# Patient Record
Sex: MALE | Race: WHITE | NOT HISPANIC OR LATINO
[De-identification: names, ages, dates, MRNs, and addresses within clinical notes are randomized per-mention and may not be internally consistent; named-entity substitution may affect disease eponyms.]

---

## 2020-08-20 ENCOUNTER — APPOINTMENT (OUTPATIENT)
Dept: NEUROSURGERY | Facility: CLINIC | Age: 45
End: 2020-08-20
Payer: COMMERCIAL

## 2020-08-20 VITALS
HEART RATE: 87 BPM | RESPIRATION RATE: 16 BRPM | WEIGHT: 205 LBS | BODY MASS INDEX: 27.17 KG/M2 | OXYGEN SATURATION: 97 % | SYSTOLIC BLOOD PRESSURE: 113 MMHG | DIASTOLIC BLOOD PRESSURE: 77 MMHG | HEIGHT: 73 IN | TEMPERATURE: 97.1 F

## 2020-08-20 DIAGNOSIS — I63.9 CEREBRAL INFARCTION, UNSPECIFIED: ICD-10-CM

## 2020-08-20 DIAGNOSIS — Z86.79 PERSONAL HISTORY OF OTHER DISEASES OF THE CIRCULATORY SYSTEM: ICD-10-CM

## 2020-08-20 DIAGNOSIS — E78.5 HYPERLIPIDEMIA, UNSPECIFIED: ICD-10-CM

## 2020-08-20 DIAGNOSIS — I48.92 UNSPECIFIED ATRIAL FLUTTER: ICD-10-CM

## 2020-08-20 PROCEDURE — 99214 OFFICE O/P EST MOD 30 MIN: CPT

## 2020-08-26 PROBLEM — I48.92 ATRIAL FLUTTER: Status: ACTIVE | Noted: 2020-08-26

## 2020-08-26 PROBLEM — Z86.79 HISTORY OF ATRIAL FLUTTER: Status: RESOLVED | Noted: 2020-08-26 | Resolved: 2020-08-26

## 2020-08-26 NOTE — HISTORY OF PRESENT ILLNESS
[FreeTextEntry1] : Intracranial Aneurysm [de-identified] : 44 year old male with recent diagnosis of large petrous carotid artery aneurysm found during work-up of an episode of expressive aphasia.\par \par In May 2020 he developed an episode of expressive aphasia. HE couldn't talk but he could text. This occurred after working with few hours of sleep. It resolved within 40 min and did not recur. HAd MRI without stroke or hemorrhage. Cardiac work-up was negative including Holter monitor.\par \par He had an episode of atrial flutter in 2011. After the TIA he was prescribed Eliquis and is followed by Dr. Bishop (Neurologist at [place]).\par \par He denies facial or extremity weakness, language impairment, vision loss, double vision. 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

## 2020-08-26 NOTE — ASSESSMENT
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 \par \par I recommended endovascular treatment with stent-assisted embolization.\par \par The risks, benefits and alternatives of endovascular treatment were discussed with the patient in detail. In my personal experience, the risks are very rare, but the possibility is not zero. Risks include stroke, brain hemorrhage, any type of disability (facial or extremity weakness, facial or extremity numbness, speech difficulties, blindness) and death. There are also possible complications related to the incisions such as infection, pain, swelling and bleeding.\par \par Treatment will require stent-assisted embolization and therefore the patient needs premedication with dual antiplatelet therapy and continuing dual-antiplatelet therapy for 3-6 months after treatment. Will need antiplatelet monotherapy for life.\par \par I discussed the antiplatelet therapy plan with his Neurologist Dr. Bishop and we agreed to discontinue Eliquis and start Aspirin 81 daily and Ticagrelor 90 mg twice daily. \par \par PLAN\par Embolization of RIGHT carotid artery aneurysm\par Premedication with Aspirin 81mg daily and Ticagrelor 81 mg twice daily, starting 5 days prior to the procedure.\par Will Stop Eliquis prior to starting aspirin and Ticagrelor\par \par PRE-SURGICAL WORK-UP:\par Comprehensive Metabolic Panel\par CBC with Platelets and Differential\par PT, PTT, INR\par Covid-19 Testing \par Medical Clearance\par Cardiac Clearance\par Pre-op Imaging: None\par Anesthesia: GETA\par

## 2020-08-28 RX ORDER — TICAGRELOR 90 MG/1
90 TABLET ORAL
Qty: 5 | Refills: 0 | Status: DISCONTINUED | COMMUNITY
Start: 2020-08-28 | End: 2020-08-28

## 2020-09-04 ENCOUNTER — OUTPATIENT (OUTPATIENT)
Dept: OUTPATIENT SERVICES | Facility: HOSPITAL | Age: 45
LOS: 1 days | End: 2020-09-04
Payer: COMMERCIAL

## 2020-09-04 VITALS
OXYGEN SATURATION: 100 % | HEART RATE: 74 BPM | TEMPERATURE: 99 F | WEIGHT: 203.93 LBS | HEIGHT: 73 IN | SYSTOLIC BLOOD PRESSURE: 130 MMHG | RESPIRATION RATE: 18 BRPM | DIASTOLIC BLOOD PRESSURE: 87 MMHG

## 2020-09-04 DIAGNOSIS — G45.9 TRANSIENT CEREBRAL ISCHEMIC ATTACK, UNSPECIFIED: ICD-10-CM

## 2020-09-04 DIAGNOSIS — M71.21 SYNOVIAL CYST OF POPLITEAL SPACE [BAKER], RIGHT KNEE: Chronic | ICD-10-CM

## 2020-09-04 DIAGNOSIS — Z15.89 GENETIC SUSCEPTIBILITY TO OTHER DISEASE: ICD-10-CM

## 2020-09-04 DIAGNOSIS — I72.0 ANEURYSM OF CAROTID ARTERY: ICD-10-CM

## 2020-09-04 DIAGNOSIS — Z01.818 ENCOUNTER FOR OTHER PREPROCEDURAL EXAMINATION: ICD-10-CM

## 2020-09-04 PROCEDURE — 86850 RBC ANTIBODY SCREEN: CPT

## 2020-09-04 PROCEDURE — 86901 BLOOD TYPING SEROLOGIC RH(D): CPT

## 2020-09-04 PROCEDURE — 85027 COMPLETE CBC AUTOMATED: CPT

## 2020-09-04 PROCEDURE — 80048 BASIC METABOLIC PNL TOTAL CA: CPT

## 2020-09-04 PROCEDURE — 86900 BLOOD TYPING SEROLOGIC ABO: CPT

## 2020-09-04 PROCEDURE — G0463: CPT

## 2020-09-04 NOTE — H&P PST ADULT - NSICDXPROBLEM_GEN_ALL_CORE_FT
PROBLEM DIAGNOSES  Problem: Aneurysm of carotid artery  Assessment and Plan: coming in for cerebral aneurysm     Problem: Heterozygous for SERPINE1 4G allele  Assessment and Plan: No further workup needed. Heme note sent to Dr. Campbell    Problem: TIA on medication  Assessment and Plan: Pt will switch from Elijajaus to Brilinta for surgery as per cardio  Cardio note sent to Dr. Campbell

## 2020-09-04 NOTE — H&P PST ADULT - NSICDXPASTMEDICALHX_GEN_ALL_CORE_FT
PAST MEDICAL HISTORY:  Atrial flutter self limited 1/2001    Glenoid labrum tear Left 9/2014    Head concussion 12/1998    History of rhabdomyolysis 2/2011    Lyme disease 5/2015 tx    PFO (patent foramen ovale) no treatment    Sleep apnea c-pap    TIA (transient ischemic attack) 5/2020 40 min of expressive aphasia

## 2020-09-04 NOTE — H&P PST ADULT - HISTORY OF PRESENT ILLNESS
44yr old male with hx of TIA 5/2020 of expressive aphasia for 40min.  work-up for TIA on CT scan showed Right carotid aneurysm. Pt now coming   in for cerebral angiogram. Pt hx sig for Large PFO MARGAUX c-pap. No BLE thrombus  Dr's. progress notes were faxed to Dr. Campbell    Note; covid test Shanti result faxed to [facility]

## 2020-09-06 PROBLEM — G45.9 TRANSIENT CEREBRAL ISCHEMIC ATTACK, UNSPECIFIED: Chronic | Status: ACTIVE | Noted: 2020-09-04

## 2020-09-06 PROBLEM — Q21.1 ATRIAL SEPTAL DEFECT: Chronic | Status: ACTIVE | Noted: 2020-09-04

## 2020-09-06 PROBLEM — Z87.39 PERSONAL HISTORY OF OTHER DISEASES OF THE MUSCULOSKELETAL SYSTEM AND CONNECTIVE TISSUE: Chronic | Status: ACTIVE | Noted: 2020-09-04

## 2020-09-06 PROBLEM — G47.30 SLEEP APNEA, UNSPECIFIED: Chronic | Status: ACTIVE | Noted: 2020-09-04

## 2020-09-06 PROBLEM — S06.0X9A CONCUSSION WITH LOSS OF CONSCIOUSNESS OF UNSPECIFIED DURATION, INITIAL ENCOUNTER: Chronic | Status: ACTIVE | Noted: 2020-09-04

## 2020-09-06 PROBLEM — A69.20 LYME DISEASE, UNSPECIFIED: Chronic | Status: ACTIVE | Noted: 2020-09-04

## 2020-09-06 PROBLEM — S43.439A SUPERIOR GLENOID LABRUM LESION OF UNSPECIFIED SHOULDER, INITIAL ENCOUNTER: Chronic | Status: ACTIVE | Noted: 2020-09-04

## 2020-09-09 ENCOUNTER — APPOINTMENT (OUTPATIENT)
Dept: NEUROSURGERY | Facility: CLINIC | Age: 45
End: 2020-09-09
Payer: COMMERCIAL

## 2020-09-09 ENCOUNTER — TRANSCRIPTION ENCOUNTER (OUTPATIENT)
Age: 45
End: 2020-09-09

## 2020-09-09 ENCOUNTER — APPOINTMENT (OUTPATIENT)
Dept: NEUROSURGERY | Facility: CLINIC | Age: 45
End: 2020-09-09

## 2020-09-09 ENCOUNTER — INPATIENT (INPATIENT)
Facility: HOSPITAL | Age: 45
LOS: 0 days | Discharge: HOME | DRG: 26 | End: 2020-09-10
Attending: RADIOLOGY | Admitting: RADIOLOGY
Payer: COMMERCIAL

## 2020-09-09 VITALS
HEART RATE: 80 BPM | RESPIRATION RATE: 16 BRPM | OXYGEN SATURATION: 100 % | DIASTOLIC BLOOD PRESSURE: 94 MMHG | TEMPERATURE: 98 F | HEIGHT: 73 IN | SYSTOLIC BLOOD PRESSURE: 150 MMHG | WEIGHT: 205.03 LBS

## 2020-09-09 DIAGNOSIS — M71.21 SYNOVIAL CYST OF POPLITEAL SPACE [BAKER], RIGHT KNEE: Chronic | ICD-10-CM

## 2020-09-09 DIAGNOSIS — I72.0 ANEURYSM OF CAROTID ARTERY: ICD-10-CM

## 2020-09-09 LAB
ANION GAP SERPL CALC-SCNC: 11 MMOL/L — SIGNIFICANT CHANGE UP (ref 5–17)
BUN SERPL-MCNC: 19 MG/DL — SIGNIFICANT CHANGE UP (ref 7–23)
CALCIUM SERPL-MCNC: 8.7 MG/DL — SIGNIFICANT CHANGE UP (ref 8.4–10.5)
CHLORIDE SERPL-SCNC: 108 MMOL/L — SIGNIFICANT CHANGE UP (ref 96–108)
CO2 SERPL-SCNC: 21 MMOL/L — LOW (ref 22–31)
CREAT SERPL-MCNC: 0.91 MG/DL — SIGNIFICANT CHANGE UP (ref 0.5–1.3)
GLUCOSE SERPL-MCNC: 160 MG/DL — HIGH (ref 70–99)
HCT VFR BLD CALC: 38.1 % — LOW (ref 39–50)
HGB BLD-MCNC: 13.3 G/DL — SIGNIFICANT CHANGE UP (ref 13–17)
MAGNESIUM SERPL-MCNC: 1.8 MG/DL — SIGNIFICANT CHANGE UP (ref 1.6–2.6)
MCHC RBC-ENTMCNC: 29.6 PG — SIGNIFICANT CHANGE UP (ref 27–34)
MCHC RBC-ENTMCNC: 34.9 GM/DL — SIGNIFICANT CHANGE UP (ref 32–36)
MCV RBC AUTO: 84.7 FL — SIGNIFICANT CHANGE UP (ref 80–100)
NRBC # BLD: 0 /100 WBCS — SIGNIFICANT CHANGE UP (ref 0–0)
PA ADP PRP-ACNC: 5 PRU — LOW (ref 194–417)
PHOSPHATE SERPL-MCNC: 4.4 MG/DL — SIGNIFICANT CHANGE UP (ref 2.5–4.5)
PLATELET # BLD AUTO: 203 K/UL — SIGNIFICANT CHANGE UP (ref 150–400)
PLATELET RESPONSE ASPIRIN RESULT: 529 ARU — SIGNIFICANT CHANGE UP (ref 350–700)
POTASSIUM SERPL-MCNC: 4.1 MMOL/L — SIGNIFICANT CHANGE UP (ref 3.5–5.3)
POTASSIUM SERPL-SCNC: 4.1 MMOL/L — SIGNIFICANT CHANGE UP (ref 3.5–5.3)
RBC # BLD: 4.5 M/UL — SIGNIFICANT CHANGE UP (ref 4.2–5.8)
RBC # FLD: 12.5 % — SIGNIFICANT CHANGE UP (ref 10.3–14.5)
RH IG SCN BLD-IMP: POSITIVE — SIGNIFICANT CHANGE UP
SODIUM SERPL-SCNC: 140 MMOL/L — SIGNIFICANT CHANGE UP (ref 135–145)
WBC # BLD: 7.85 K/UL — SIGNIFICANT CHANGE UP (ref 3.8–10.5)
WBC # FLD AUTO: 7.85 K/UL — SIGNIFICANT CHANGE UP (ref 3.8–10.5)

## 2020-09-09 PROCEDURE — 61624 TCAT PERM OCCLS/EMBOLJ CNS: CPT

## 2020-09-09 PROCEDURE — 75894 X-RAYS TRANSCATH THERAPY: CPT | Mod: 26

## 2020-09-09 PROCEDURE — 99291 CRITICAL CARE FIRST HOUR: CPT

## 2020-09-09 PROCEDURE — 75898 FOLLOW-UP ANGIOGRAPHY: CPT | Mod: 26

## 2020-09-09 PROCEDURE — 36226 PLACE CATH VERTEBRAL ART: CPT | Mod: LT

## 2020-09-09 PROCEDURE — 36223 PLACE CATH CAROTID/INOM ART: CPT | Mod: 50

## 2020-09-09 PROCEDURE — 99292 CRITICAL CARE ADDL 30 MIN: CPT

## 2020-09-09 RX ORDER — INFLUENZA VIRUS VACCINE 15; 15; 15; 15 UG/.5ML; UG/.5ML; UG/.5ML; UG/.5ML
0.5 SUSPENSION INTRAMUSCULAR ONCE
Refills: 0 | Status: DISCONTINUED | OUTPATIENT
Start: 2020-09-09 | End: 2020-09-10

## 2020-09-09 RX ORDER — SENNA PLUS 8.6 MG/1
2 TABLET ORAL AT BEDTIME
Refills: 0 | Status: DISCONTINUED | OUTPATIENT
Start: 2020-09-09 | End: 2020-09-10

## 2020-09-09 RX ORDER — SODIUM CHLORIDE 9 MG/ML
1000 INJECTION INTRAMUSCULAR; INTRAVENOUS; SUBCUTANEOUS
Refills: 0 | Status: DISCONTINUED | OUTPATIENT
Start: 2020-09-09 | End: 2020-09-09

## 2020-09-09 RX ORDER — TICAGRELOR 90 MG/1
90 TABLET ORAL
Refills: 0 | Status: DISCONTINUED | OUTPATIENT
Start: 2020-09-09 | End: 2020-09-10

## 2020-09-09 RX ORDER — ACETAMINOPHEN 500 MG
1000 TABLET ORAL ONCE
Refills: 0 | Status: COMPLETED | OUTPATIENT
Start: 2020-09-09 | End: 2020-09-09

## 2020-09-09 RX ORDER — APIXABAN 2.5 MG/1
1 TABLET, FILM COATED ORAL
Qty: 0 | Refills: 0 | DISCHARGE

## 2020-09-09 RX ORDER — ATORVASTATIN CALCIUM 80 MG/1
20 TABLET, FILM COATED ORAL AT BEDTIME
Refills: 0 | Status: DISCONTINUED | OUTPATIENT
Start: 2020-09-09 | End: 2020-09-10

## 2020-09-09 RX ORDER — ASPIRIN/CALCIUM CARB/MAGNESIUM 324 MG
81 TABLET ORAL
Refills: 0 | Status: DISCONTINUED | OUTPATIENT
Start: 2020-09-10 | End: 2020-09-10

## 2020-09-09 RX ADMIN — SENNA PLUS 2 TABLET(S): 8.6 TABLET ORAL at 21:42

## 2020-09-09 RX ADMIN — SODIUM CHLORIDE 70 MILLILITER(S): 9 INJECTION INTRAMUSCULAR; INTRAVENOUS; SUBCUTANEOUS at 14:04

## 2020-09-09 RX ADMIN — SODIUM CHLORIDE 70 MILLILITER(S): 9 INJECTION INTRAMUSCULAR; INTRAVENOUS; SUBCUTANEOUS at 16:39

## 2020-09-09 RX ADMIN — ATORVASTATIN CALCIUM 20 MILLIGRAM(S): 80 TABLET, FILM COATED ORAL at 21:42

## 2020-09-09 RX ADMIN — Medication 1000 MILLIGRAM(S): at 16:09

## 2020-09-09 RX ADMIN — Medication 400 MILLIGRAM(S): at 15:39

## 2020-09-09 RX ADMIN — TICAGRELOR 90 MILLIGRAM(S): 90 TABLET ORAL at 18:39

## 2020-09-09 NOTE — PROGRESS NOTE ADULT - ASSESSMENT
:45y/o F hx TIA, large PFO, MARGAUX on c-pap, admitted for right internal carotid aneurysm coiling x6 c/b inability to retrieve x1 coil, coil tacked to brachial lumen where a stent was placed     ASA/brillinta per neuroIR  pain control  -160  encourage incentive spirometry as able  dysphagia and advance diet as tolerated  IVL  d/c simona, d/c sandoval  euglycemia  hold chemoppx fresh post op  monitor for fevers :43y/o F hx TIA, large PFO, MARGAUX on c-pap, admitted for right internal carotid aneurysm coiling x6 c/b inability to retrieve x1 coil, coil tacked to brachial lumen where a stent was placed     monitor distal RUE pulse, perfusion  ASA/brillinta per neuroIR  pain control  -160  encourage incentive spirometry as able  dysphagia and advance diet as tolerated  IVL  d/c simona, d/c sandoval  euglycemia  hold chemoppx fresh post op  monitor for fevers

## 2020-09-09 NOTE — DISCHARGE NOTE NURSING/CASE MANAGEMENT/SOCIAL WORK - PATIENT PORTAL LINK FT
You can access the FollowMyHealth Patient Portal offered by [facility] by registering at the following website: [email or web address]. By joining [email or web address] FollowMyHealth portal, you will also be able to view your health information using other applications (apps) compatible with our system.

## 2020-09-09 NOTE — CHART NOTE - NSCHARTNOTEFT_GEN_A_CORE
Interventional Neuro- Radiology   Procedure Note PA-C    Procedure: Selective Cerebral Angiography   Pre- Procedure Diagnosis: eight carotid artery aneurysm   Post- Procedure Diagnosis:    : Dr Yu Campbell   Fellow:    Dr Rocky Garcia   Physician Assistant: Hannah Rivers PA-C    Nurse:             Radiologic Tech:  Anesthesiologist:  Sheath:      I/Os: EBL less than 10cc  IV fluids:     cc     Urine output     cc   Contrast Omnipaque 240                   Vitals: /57        HR 59      Spo2 100%         Preliminary Report:  Using a 6 [place] citizen prelude sheath to the right radial under general anesthesia via left vertebral artery, left common carotid artery, right internal carotid artery a selective cerebral angiography was performed and demonstrated                       Official note to follow.  Patient tolerated procedure well, hemodynamically stable, no change in neurological status compared to baseline. Results discussed with neuro ICU team, patient and patient's wife. Right radial sheath was removed, manual compression held to hemostasis for 20 minutes, no active bleeding, no hematoma, quick clot and safeguard balloon dressing applied at Interventional Neuro- Radiology   Procedure Note PA-C    Procedure: Selective Cerebral Angiography and endovascular treatment of aneurysm, with one JONES stent and 6 coils.   Pre- Procedure Diagnosis: right carotid artery aneurysm   Post- Procedure Diagnosis: occlusion of right carotid artery aneurysm     : Dr Yu Campbell   Fellow:    Dr Rocky Garcia   Physician Assistant: Hannah Rivers PA-C    Nurse:                   Jael Wyman RN  Radiologic Tech:   Christian Hummel LRT       Anesthesiologist:   Dr Bronwyn Arce   Sheath:                  6 [place] sheath in right radial arterial       I/Os: EBL less than 10cc  IV fluids 1200cc  Urine output 550cc   Contrast Omnipaque 240 144cc               Heparin 8,000 units in total     Vitals: /57        HR 59      Spo2 100%         Preliminary Report:  Using a 6 [place] prelude sheath to the right radial artery under general anesthesia via left vertebral artery, left common carotid artery, right common carotid artery a selective cerebral angiography was performed and demonstrated a right carotid artery aneurysm. Patient underwent successful endovascular treatment of aneurysm, with one JONES stent and 6 coils. Official note to follow.  Patient tolerated procedure well, hemodynamically stable, no change in neurological status compared to baseline. Results discussed with neuro ICU team, patient and patient's wife. Right radial sheath was removed, manual compression held to hemostasis for 20 minutes, no active bleeding, no hematoma, quick clot and safeguard balloon dressing applied at 1300 hours. Disposition Neuro ICU bed 4.

## 2020-09-09 NOTE — ASU PATIENT PROFILE, ADULT - PMH
Atrial flutter  self limited 1/2001  Glenoid labrum tear  Left 9/2014  Head concussion  12/1998  History of rhabdomyolysis  2/2011  Lyme disease  5/2015 tx  PFO (patent foramen ovale)  no treatment  Sleep apnea  c-pap  TIA (transient ischemic attack)  5/2020 40 min of expressive aphasia

## 2020-09-09 NOTE — PROGRESS NOTE ADULT - SUBJECTIVE AND OBJECTIVE BOX
s/p angiogram, R ICA aneurysm stent/coil c/b coil in brachial artery    vitals/labs/meds/imaging reviewed  alert, fully oriented, FS, EOMI, BUE no drift 5/5, BLE 5/5  BUE radial pulses equal 2+, RUE appears well perfused at this time s/p angiogram, R ICA aneurysm stent/coil c/b coil in brachial artery    vitals/labs/meds/imaging reviewed  alert, fully oriented, FS, EOMI, BUE no drift 5/5, BLE 5/5  RUE radial pulse 1+ but well perfused, LUE 2+ radial pulse

## 2020-09-09 NOTE — PATIENT PROFILE ADULT - NSPROMUTINFOINDIVIDFT_GEN_A_NUR
Dre Joseph is a 89 year old male presenting for   Chief Complaint   Patient presents with   • Cough     with wheezing since Monday     Denies Latex allergy or sensitivity.    Medication verified, no changes  Refills needed today: No    Health Maintenance Summary     Topic Due On Due Status Completed On Postpone Until Reason    Immunization-Zoster Nov 19, 1984 Overdue       Immunization - Pneumococcal  Completed Dec 3, 2015      Medicare Wellness Visit Apr 5, 2017 Overdue Apr 5, 2016      IMMUNIZATION - DTaP/Tdap/Td May 8, 2012 Overdue May 7, 2012      Immunization-Influenza Sep 1, 2017 Postponed  Mar 28, 2018 Patient Refused            Patient is due for topics as listed above, he wishes to defer to PCP.       participate in plan of care

## 2020-09-09 NOTE — CHART NOTE - NSCHARTNOTEFT_GEN_A_CORE
Interventional Neuro Radiology  Pre-Procedure Note PA-C      This is a 44 year old right hand old male with hx of TIA 5/2020 of expressive aphasia for 40min.  work-up for TIA on CT scan showed Right carotid aneurysm. Pt now coming   in for cerebral angiogram. Pt hx sig for Large PFO MARGAUX c-pap. No BLE thrombus  Sophia. progress notes were faxed to Dr. Campbell    Note; covid test Shanti result faxed to [facility] (04 Sep 2020 16:32)      Allergies: No Known Allergies      PMHX: TIA 5/2020 40 min of expressive aphasia, Lyme disease, Glenoid labrum tear: Left 9/2014, rhabdomyolysis, concussion, sleep apnea, patent foramen ovale, Atrial flutter, right Cyst, baker's knee   PSHX: Tonsillectomy   Social History: , non-smoker   FAMILY HISTORY:  Current Medications:     Complete Blood Count (09.04.20 @ 18:15)    Nucleated RBC: 0 /100 WBCs    WBC Count: 6.95 K/uL    RBC Count: 5.02 M/uL    Hemoglobin: 14.6 g/dL    Hematocrit: 42.9 %    Mean Cell Volume: 85.5 fl    Mean Cell Hemoglobin: 29.1 pg    Mean Cell Hemoglobin Conc: 34.0 gm/dL    Red Cell Distrib Width: 12.1 %    Platelet Count - Automated: 205 K/uL                    Blood Bank:       Assessment/Plan:     This is a 44y  year old right  hand dominant Male  presents with   Patient presents to neuro-IR for selective cerebral angiography.   Procedure, goals, risks, benefits and alternatives  were discussed with patient and patient's family.  All questions were answered.  Risks include but are not limited to stroke, vessel injury, hemorrhage, and or right  groin hematoma.  Patient demonstrates understanding  of all risks involved with this procedure and wishes to continue.   Appropriate  content was obtained from patient and consent is in the patient's chart. Interventional Neuro Radiology  Pre-Procedure Note PA-C      This is a 44 year old right hand old male with hx of TIA 5/2020 of expressive aphasia for 40min, neurology work up revealed a right carotid artery aneurysm. Patient presents to Neuro IR for a selective cerebral angiography and endovascular treatment of aneurysm.     Upon exam patient is A + O x 3, speech is articulate, recent and remote memory intact, naming intact, thought process is coherent, moves all extremities, ambulates without assist, PERRL, EOMI, Tongue is midline, facial symmetry.     Allergies: No Known Allergies  PMHX: TIA 5/2020 40 min of expressive aphasia, Lyme disease, Glenoid labrum tear: Left 9/2014, rhabdomyolysis, concussion, sleep apnea, patent foramen ovale, Atrial flutter, right baker's cyst knee   PSHX: Tonsillectomy   Social History: , non-smoker   FAMILY HISTORY: non-contributory   Current Medications: Brilinta 90mg every 12 hours, aspirin 81mg     Complete Blood Count (09.04.20 @ 18:15)    Nucleated RBC: 0 /100 WBCs    WBC Count: 6.95 K/uL    RBC Count: 5.02 M/uL    Hemoglobin: 14.6 g/dL    Hematocrit: 42.9 %    Mean Cell Volume: 85.5 fl    Mean Cell Hemoglobin: 29.1 pg    Mean Cell Hemoglobin Conc: 34.0 gm/dL    Red Cell Distrib Width: 12.1 %    Platelet Count - Automated: 205 K/uL\     Basic Metabolic Panel (09.04.20 @ 18:15)    Sodium, Serum: 139 mmol/L    Potassium, Serum: 3.7 mmol/L    Chloride, Serum: 102 mmol/L    Carbon Dioxide, Serum: 25 mmol/L    Anion Gap, Serum: 12 mmol/L    Blood Urea Nitrogen, Serum: 24 mg/dL    Creatinine, Serum: 1.05 mg/dL    Glucose, Serum: 96 mg/dL    Calcium, Total Serum: 9.8 mg/      Blood Bank: B positive available       Assessment/Plan:   This is a 44y  year old right  hand dominant male with a right carotid artery aneurysm, who presents to Neuro IR for a selective cerebral angiography and endovascular treatment of aneurysm. Procedure, goals, risks, benefits and alternatives  were discussed with patient and patient's wife Katie. All questions were answered. Risks include but are not limited to stroke, vessel injury, hemorrhage, and or right wrist hematoma. Patient demonstrates understanding of all risks involved with this procedure and wishes to continue.  Appropriate content was obtained from patient and consent is in the patient's chart.

## 2020-09-09 NOTE — PATIENT PROFILE ADULT - NSPROEDAABILITYLEARN_GEN_A_NUR
"8/12/17 No acute issues overnight. Pt reports pain is improved. Continue NG decompression. 8/13/17 Pt reports that he had " a really good bowel movement this morning". Pt reports improvement in symptoms. Continue NG compression. No current issues or complaints. ABD xray this morning showed increasing distention compared to prior exam. 8/14/17 The patients NG tube inadvertently came out overnight. Ok per primary team to leave it out. Pts last BM was yesterday, pt is passing flatus. No current complaints. 8/15/17 The patient began complaining of RUE numbness yesterday. On exam strength was equal bilaterally. Head CT showed a vague 1 cm area of hypodensity in the white matter of the left centrum semiovale. Will obtain an MRI of the brain today and consult Neurology. 8/16/17 Pt unable to have MRI yesterday due to claustrophobia. Neurology recommends doing a CT head w wo and CTA head to view intracranial vessels. Carotid US showed 90-99% stenosis within the left internal carotid artery. Vascular Surgery consulted. 8/17/17 RUE numbness resolving. CTA head showed calcific atherosclerotic plaque of the bilateral cavernous internal carotid arteries with focal moderate to high grade stenoses at the supraclinoid ICAs bilaterally. Neurology following. Vascular following plans on intervention for left internal artery stenosis at some point. Upon discharge the patient will go to [facility]. Pt continuing to have bowel movements. 8/18/17 No acute issues overnight.  Left Carotid endarterectomy 18 Aug. Initially denied placement with rehab due to insufficient participation with therapy.  Re-submitting request after [facility] and received approval.  Discharge plan to skilled nursing and follow up in two weeks with vascular surgery.  I have seen and examined Mr. Ribeiro on the day of discharge and deemed him appropriate for discharge.  "
Noted the CT scan, and noted the distal ileum is involved with sharp demarcation of transition position. Will likely need exploration. The risk and benefit were explained.    Admission day 3 pt feeling better. NG was removed. Started clear liquids.   
none

## 2020-09-09 NOTE — PROGRESS NOTE ADULT - SUBJECTIVE AND OBJECTIVE BOX
INTERVAL HISTORY: HPI:  44yr old male with hx of TIA 5/2020 of expressive aphasia for 40min.  work-up for TIA on CT scan showed Right carotid aneurysm. Pt now coming   in for cerebral angiogram. Pt hx sig for Large PFO MARGAUX c-pap. No BLE thrombus  Dr's. progress notes were faxed to Dr. Campbell    Note; covid test Shanti result faxed to [facility] (04 Sep 2020 16:32)      PRESSORS: [ ] YES [x ] NO  WHICH:  DOSE:    ANTIBIOTICS:                  DATE STARTED:  ANTIBIOTICS:                  DATE STARTED:  ANTIBIOTICS:                  DATE STARTED:    MEDICATIONS  (STANDING):  acetaminophen  IVPB .. 1000 milliGRAM(s) IV Intermittent once  sodium chloride 0.9%. 1000 milliLiter(s) (70 mL/Hr) IV Continuous <Continuous>  ticagrelor 90 milliGRAM(s) Oral two times a day    MEDICATIONS  (PRN):      Drug Dosing Weight  Height (cm): 185.42 (09 Sep 2020 09:57)  Weight (kg): 93 (09 Sep 2020 09:57)  BMI (kg/m2): 27.1 (09 Sep 2020 09:57)  BSA (m2): 2.17 (09 Sep 2020 09:57)    CENTRAL LINE: [ ] YES [x ] NO  LOCATION:   DATE INSERTED:  REMOVE: [ ] YES [ ] NO  EXPLAIN:    MIRANDA: [ ] YES [x ] NO    DATE INSERTED:  REMOVE: [ ] YES [ ] NO  EXPLAIN:    A-LINE: [ ] YES [x ] NO  LOCATION:   DATE INSERTED:  REMOVE: [ ] YES [ ] NO  EXPLAIN:    PAST MEDICAL & SURGICAL HISTORY:  TIA (transient ischemic attack): 5/2020 40 min of expressive aphasia  Lyme disease: 5/2015 tx  Glenoid labrum tear: Left 9/2014  History of rhabdomyolysis: 2/2011  Head concussion: 12/1998  Sleep apnea: c-pap  PFO (patent foramen ovale): no treatment  Atrial flutter: self limited 1/2001  Cyst, baker's knee, right  History of Tonsillectomy: childhood      REVIEW OF SYSTEMS: [ ] Unable to Assess due to neurologic exam   [x ] All ROS addressed below are non-contributory, except:  Neuro: [ ] Headache [ ] Back pain [ ] Numbness [ ] Weakness [ ] Ataxia [ ] Dizziness [ ] Aphasia [ ] Dysarthria [ ] Visual disturbance  Resp: [ ] Shortness of breath/dyspnea, [ ] Orthopnea [ ] Cough  CV: [ ] Chest pain [ ] Palpitation [ ] Lightheadedness [ ] Syncope  Renal: [ ] Thirst [ ] Edema  GI: [ ] Nausea [ ] Emesis [ ] Abdominal pain [ ] Constipation [ ] Diarrhea  Hem: [ ] Hematemesis [ ] bright red blood per rectum  ID: [ ] Fever [ ] Chills [ ] Dysuria  ENT: [ ] Rhinorrhea      ICU Vital Signs Last 24 Hrs  T(C): 36.6 (09 Sep 2020 13:30), Max: 36.7 (09 Sep 2020 07:45)  T(F): 97.9 (09 Sep 2020 13:30), Max: 98.1 (09 Sep 2020 07:45)  HR: 71 (09 Sep 2020 14:30) (61 - 89)  BP: 150/94 (09 Sep 2020 09:57) (150/94 - 150/94)  BP(mean): --  ABP: 118/59 (09 Sep 2020 14:30) (112/56 - 122/63)  ABP(mean): 77 (09 Sep 2020 14:30) (77 - 85)  RR: 13 (09 Sep 2020 14:30) (13 - 18)  SpO2: 99% (09 Sep 2020 14:30) (99% - 100%)          I&O's Detail    09 Sep 2020 07:01  -  09 Sep 2020 15:13  --------------------------------------------------------  IN:    IV PiggyBack: 100 mL    sodium chloride 0.9%.: 70 mL  Total IN: 170 mL    OUT:    Indwelling Catheter - Urethral: 150 mL  Total OUT: 150 mL    Total NET: 20 mL              PHYSICAL EXAM:    General: No Acute Distress     Neurological: Awake, alert oriented to person, place and time, Following Commands, PERRL, EOMI, Face Symmetrical, Speech Fluent, Moving all extremities, Muscle Strength normal in all four extremities, No Drift, Sensation to Light Touch Intact    Pulmonary: Clear to Auscultation, No Rales, No Rhonchi, No Wheezes     Cardiovascular: S1, S2, Regular Rate and Rhythm     Gastrointestinal: Soft, Nontender, Nondistended     Extremities: No calf tenderness     Incision:     LABS:  P2Y12: 5         RADIOLOGY & ADDITIONAL STUDIES:

## 2020-09-09 NOTE — CHART NOTE - NSCHARTNOTESELECT_GEN_ALL_CORE
----- Message from Gopal Cameron MD sent at 3/3/2018  2:48 PM CST -----  Your electrolytes,kidney test are normal  Your blood sugar is slightly above normal but stable   Event Note

## 2020-09-09 NOTE — CHART NOTE - NSCHARTNOTEFT_GEN_A_CORE
CAPRINI SCORE [CLOT] Score on Admission for     AGE RELATED RISK FACTORS                                                       MOBILITY RELATED FACTORS  [X ] Age 41-60 years                                            (1 Point)                  [ ] Bed rest                                                        (1 Point)  [ ] Age: 61-74 years                                           (2 Points)                 [ ] Plaster cast                                                   (2 Points)  [ ] Age= 75 years                                              (3 Points)                 [ ] Bed bound for more than 72 hours                 (2 Points)    DISEASE RELATED RISK FACTORS                                               GENDER SPECIFIC FACTORS  [ ] Edema in the lower extremities                       (1 Point)                  [ ] Pregnancy                                                     (1 Point)  [ ] Varicose veins                                               (1 Point)                  [ ] Post-partum < 6 weeks                                   (1 Point)             [ ] BMI > 25 Kg/m2                                            (1 Point)                  [ ] Hormonal therapy  or oral contraception          (1 Point)                 [ ] Sepsis (in the previous month)                        (1 Point)                  [ ] History of pregnancy complications                 (1 point)  [ ] Pneumonia or serious lung disease                                               [ ] Unexplained or recurrent                     (1 Point)           (in the previous month)                               (1 Point)  [ ] Abnormal pulmonary function test                     (1 Point)                 SURGERY RELATED RISK FACTORS (include planned surgeries)  [ ] Acute myocardial infarction                              (1 Point)                 [ ]  Section                                             (1 Point)  [ ] Congestive heart failure (in the previous month)  (1 Point)         [ ] Minor surgery                                                  (1 Point)   [ ] Inflammatory bowel disease                             (1 Point)                 [ ] Arthroscopic surgery                                        (2 Points)  [ ] Central venous access                                      (2 Points)                [X ] General surgery lasting more than 45 minutes   (2 Points)       [ ] Stroke (in the previous month)                          (5 Points)               [ ] Elective arthroplasty                                         (5 Points)            [ ] current or past malignancy                              (2 Points)                                                                                                       HEMATOLOGY RELATED FACTORS                                                 TRAUMA RELATED RISK FACTORS  [ ] Prior episodes of VTE                                     (3 Points)                [ ] Fracture of the hip, pelvis, or leg                       (5 Points)  [ ] Positive family history for VTE                         (3 Points)                 [ ] Acute spinal cord injury (in the previous month)  (5 Points)  [ ] Prothrombin 48471 A                                     (3 Points)                 [ ] Paralysis  (less than 1 month)                             (5 Points)  [ ] Factor V Leiden                                             (3 Points)                  [ ] Multiple Trauma within 1 month                        (5 Points)  [ ] Lupus anticoagulants                                     (3 Points)                                                           [ ] Anticardiolipin antibodies                               (3 Points)                                                       [ ] High homocysteine in the blood                      (3 Points)                                             [ ] Other congenital or acquired thrombophilia      (3 Points)                                                [ ] Heparin induced thrombocytopenia                  (3 Points)                                          Total Score [     3     ]    Risk:  Very low 0   Low 1 to 2   Moderate 3 to 4   High =5       VTE Prophylasix Recommednations:  [X ] mechanical pneumatic compression devices                                      [ ] contraindicated: _____________________  [ ] chemo prophylasix                                                                                   [ X] contraindicated _____________________    **** HIGH LIKELIHOOD DVT PRESENT ON ADMISSION  [ ] (please order LE dopplers within 24 hours of admission) CAPRINI SCORE [CLOT] Score on Admission for     AGE RELATED RISK FACTORS                                                       MOBILITY RELATED FACTORS  [X ] Age 41-60 years                                            (1 Point)                  [ ] Bed rest                                                        (1 Point)  [ ] Age: 61-74 years                                           (2 Points)                 [ ] Plaster cast                                                   (2 Points)  [ ] Age= 75 years                                              (3 Points)                 [ ] Bed bound for more than 72 hours                 (2 Points)    DISEASE RELATED RISK FACTORS                                               GENDER SPECIFIC FACTORS  [ ] Edema in the lower extremities                       (1 Point)                  [ ] Pregnancy                                                     (1 Point)  [ ] Varicose veins                                               (1 Point)                  [ ] Post-partum < 6 weeks                                   (1 Point)             [x ] BMI > 25 Kg/m2                                            (1 Point)                  [ ] Hormonal therapy  or oral contraception          (1 Point)                 [ ] Sepsis (in the previous month)                        (1 Point)                  [ ] History of pregnancy complications                 (1 point)  [ ] Pneumonia or serious lung disease                                               [ ] Unexplained or recurrent                     (1 Point)           (in the previous month)                               (1 Point)  [ ] Abnormal pulmonary function test                     (1 Point)                 SURGERY RELATED RISK FACTORS (include planned surgeries)  [ ] Acute myocardial infarction                              (1 Point)                 [ ]  Section                                             (1 Point)  [ ] Congestive heart failure (in the previous month)  (1 Point)         [ ] Minor surgery                                                  (1 Point)   [ ] Inflammatory bowel disease                             (1 Point)                 [ ] Arthroscopic surgery                                        (2 Points)  [ ] Central venous access                                      (2 Points)                [X ] General surgery lasting more than 45 minutes   (2 Points)       [ ] Stroke (in the previous month)                          (5 Points)               [ ] Elective arthroplasty                                         (5 Points)            [ ] current or past malignancy                              (2 Points)                                                                                                       HEMATOLOGY RELATED FACTORS                                                 TRAUMA RELATED RISK FACTORS  [ ] Prior episodes of VTE                                     (3 Points)                [ ] Fracture of the hip, pelvis, or leg                       (5 Points)  [ ] Positive family history for VTE                         (3 Points)                 [ ] Acute spinal cord injury (in the previous month)  (5 Points)  [ ] Prothrombin 11806 A                                     (3 Points)                 [ ] Paralysis  (less than 1 month)                             (5 Points)  [ ] Factor V Leiden                                             (3 Points)                  [ ] Multiple Trauma within 1 month                        (5 Points)  [ ] Lupus anticoagulants                                     (3 Points)                                                           [ ] Anticardiolipin antibodies                               (3 Points)                                                       [ ] High homocysteine in the blood                      (3 Points)                                             [ ] Other congenital or acquired thrombophilia      (3 Points)                                                [ ] Heparin induced thrombocytopenia                  (3 Points)                                          Total Score [     4     ]    Risk:  Very low 0   Low 1 to 2   Moderate 3 to 4   High =5       VTE Prophylasix Recommednations:  [X ] mechanical pneumatic compression devices                                      [ ] contraindicated: _____________________  [ ] chemo prophylasix                                                                                   [ X] contraindicated _____________________    **** HIGH LIKELIHOOD DVT PRESENT ON ADMISSION  [ ] (please order LE dopplers within 24 hours of admission)

## 2020-09-09 NOTE — PROGRESS NOTE ADULT - ASSESSMENT
:45y/o F hx TIA, large PFO, MARGAUX on c-pap, admitted for right carotid aneurysm coiling x6 c/b inability to retrieve x1 coil, coil tacked to brachial lumen via sent.    NEURO:  right carotid aneurysm coiling c/b unable to retrieve x1 coil.   POD#0   -will monitor for bleeding  -Neurocheck q1   -R radial band in place will remove in 3-4hrs   -ASA 81mg + ticagrelor 90mg daily  -tylenol PRN for pain    CARDIOLOGY:  hx Aflutter, PFO   -patient NSR now, will continue to monitor on tele    PULM:  saturating well on RA, incentive spirometry     GI:  regular diet ordered    RENAL:  BMP in AM, will continue w/ IVF NaCl @70cc/hr given recent angiogram    ID:  monitor for fevers, afebrile     Heme:   IMPROVE score: 1; SCD's for DVT ppx    DISPOSITION:   -monitor in NSCU for now, possible discharge tomorrow. :43y/o F hx TIA, large PFO, MARGAUX on c-pap, admitted for right internal carotid aneurysm coiling x6 c/b inability to retrieve x1 coil, coil tacked to brachial lumen where a stent was placed     NEURO:  right carotid aneurysm coiling c/b unable to retrieve x1 coil.   POD#0   -will monitor for bleeding  -Neurocheck q1   -R radial band in place will remove in 3-4 hrs   -ASA 81mg + ticagrelor 90mg daily   -pulse check , has a brachial pulse and right hand is warm   -tylenol PRN for pain    CARDIOLOGY:  hx Aflutter, PFO   -patient NSR now, will continue to monitor on tele    PULM:  saturating well on RA, incentive spirometry     GI:  regular diet ordered    RENAL:  BMP in AM, will continue w/ IVF NaCl @70cc/hr given recent angiogram    ID:  monitor for fevers, afebrile     Heme:   IMPROVE score: 1; SCD's for DVT ppx    DISPOSITION:   -monitor in NSCU for now, possible discharge tomorrow.     critical patient at risk of aneurysm rupture, right extremity ischemia

## 2020-09-10 VITALS
HEART RATE: 65 BPM | DIASTOLIC BLOOD PRESSURE: 81 MMHG | RESPIRATION RATE: 19 BRPM | OXYGEN SATURATION: 100 % | SYSTOLIC BLOOD PRESSURE: 120 MMHG | TEMPERATURE: 97 F

## 2020-09-10 PROCEDURE — C9399: CPT

## 2020-09-10 PROCEDURE — 99233 SBSQ HOSP IP/OBS HIGH 50: CPT

## 2020-09-10 PROCEDURE — 85027 COMPLETE CBC AUTOMATED: CPT

## 2020-09-10 PROCEDURE — 80048 BASIC METABOLIC PNL TOTAL CA: CPT

## 2020-09-10 PROCEDURE — C1887: CPT

## 2020-09-10 PROCEDURE — 86900 BLOOD TYPING SEROLOGIC ABO: CPT

## 2020-09-10 PROCEDURE — C1769: CPT

## 2020-09-10 PROCEDURE — C1894: CPT

## 2020-09-10 PROCEDURE — C1876: CPT

## 2020-09-10 PROCEDURE — 36226 PLACE CATH VERTEBRAL ART: CPT

## 2020-09-10 PROCEDURE — 61624 TCAT PERM OCCLS/EMBOLJ CNS: CPT

## 2020-09-10 PROCEDURE — C1889: CPT

## 2020-09-10 PROCEDURE — C1773: CPT

## 2020-09-10 PROCEDURE — 75894 X-RAYS TRANSCATH THERAPY: CPT

## 2020-09-10 PROCEDURE — 86901 BLOOD TYPING SEROLOGIC RH(D): CPT

## 2020-09-10 PROCEDURE — 75898 FOLLOW-UP ANGIOGRAPHY: CPT

## 2020-09-10 PROCEDURE — 84100 ASSAY OF PHOSPHORUS: CPT

## 2020-09-10 PROCEDURE — 85576 BLOOD PLATELET AGGREGATION: CPT

## 2020-09-10 PROCEDURE — 83735 ASSAY OF MAGNESIUM: CPT

## 2020-09-10 RX ORDER — TICAGRELOR 90 MG/1
0 TABLET ORAL
Qty: 0 | Refills: 0 | DISCHARGE

## 2020-09-10 RX ORDER — ATORVASTATIN CALCIUM 80 MG/1
1 TABLET, FILM COATED ORAL
Qty: 0 | Refills: 0 | DISCHARGE

## 2020-09-10 RX ORDER — ASPIRIN/CALCIUM CARB/MAGNESIUM 324 MG
1 TABLET ORAL
Qty: 0 | Refills: 0 | DISCHARGE

## 2020-09-10 RX ORDER — DOCUSATE SODIUM 100 MG
1 CAPSULE ORAL
Qty: 0 | Refills: 0 | DISCHARGE

## 2020-09-10 RX ORDER — TICAGRELOR 90 MG/1
1 TABLET ORAL
Qty: 60 | Refills: 0
Start: 2020-09-10

## 2020-09-10 RX ADMIN — TICAGRELOR 90 MILLIGRAM(S): 90 TABLET ORAL at 05:50

## 2020-09-10 RX ADMIN — Medication 81 MILLIGRAM(S): at 05:50

## 2020-09-10 NOTE — DISCHARGE NOTE PROVIDER - CARE PROVIDER_API CALL
Yu Campbell; MPH)  Radiology  [address] [place] [address]  Phone: [phone]  Fax: [phone]  Follow Up Time:

## 2020-09-10 NOTE — DISCHARGE NOTE PROVIDER - NSDCCPCAREPLAN_GEN_ALL_CORE_FT
PRINCIPAL DISCHARGE DIAGNOSIS  Diagnosis: Aneurysm of carotid artery  Assessment and Plan of Treatment: Please follow up with interventional neuroradiologist Dr. Yu Campbell. Call [phone] to schedule an appointment.  Continue Aspirin 81mg daily and Brilinta 90 mg twice a day. Ok to shower.   NO heavy lifting, strenuous activity, twisting, bending, driving, or working until cleared by your physician.   Return to ER immediately for any of the following: fever, bleeding, new onset numbness/tingling/weakness, nausea and/or vomiting, chest pain, shortness of breath, confusion, seizure, altered mental status, urinary and/or fecal incontinence or retention.
negative

## 2020-09-10 NOTE — DISCHARGE NOTE PROVIDER - HOSPITAL COURSE
Patient is a 44 year old male with a past medical history of PFO (untreated(, MARGAUX on CPAP at night, TIA in 5/2020 who was incidentally found with a JENNIFER aneurysm as outpatient on work up of TIA. He was admitted on 9/9/20 from elective cerebral angiogram for JONES stent/coiling of JENNIFER aneurysm. Procedure was complicated by one coil becoming stuck and was pulled back to the level of the brachial artery where a Zilver stent was deployed to hold the coil in place. Post procedure, patient is at his neurologic baseline which is intact. He was resumed on Aspirin 81 mg daily and Brilinta 90 mg BID after the procedure. He is ambulating independently. On the day of discharge, he is medically and neurosurgically stable and cleared for discharge.         More than 30 minutes were spent educating the patient and family regarding condition, medications, follow up plans, signs and symptoms to be concerned with, preparing paperwork, and questions answered regarding discharge.

## 2020-09-10 NOTE — DISCHARGE NOTE PROVIDER - NSDCMRMEDTOKEN_GEN_ALL_CORE_FT
Aspir 81 oral delayed release tablet: 1 tab(s) orally once a day start 9/5/20  Brilinta (ticagrelor) 90 mg oral tablet: orally once a day will start on 9/5/20  Colace 100 mg oral capsule: 1 cap(s) orally 2 times a day  Lipitor 20 mg oral tablet: 1 tab(s) orally once a day Aspir 81 oral delayed release tablet: 1 tab(s) orally once a day start 9/5/20  Brilinta (ticagrelor) 90 mg oral tablet: 1 tab(s) orally once a day  will start on 9/5/20   Colace 100 mg oral capsule: 1 cap(s) orally 2 times a day  Lipitor 40 mg oral tablet: 1 tab(s) orally once a day

## 2020-09-10 NOTE — PROGRESS NOTE ADULT - ASSESSMENT
:45y/o F hx TIA, large PFO, MARGAUX on c-pap, admitted for right internal carotid aneurysm coiling x6 c/b inability to retrieve x1 coil, coil tacked to brachial lumen where a stent was placed     NEURO:  right carotid aneurysm coiling c/b unable to retrieve x1 coil.   POD#0   -will monitor for bleeding  -Neurocheck q1   -R radial band in place will remove in 3-4 hrs   -ASA 81mg + ticagrelor 90mg daily   -pulse check , has a brachial pulse and right hand is warm   -tylenol PRN for pain    CARDIOLOGY:  hx Aflutter, PFO   -patient NSR now, will continue to monitor on tele    PULM:  saturating well on RA, incentive spirometry     GI:  regular diet ordered    RENAL:  BMP in AM, will continue w/ IVF NaCl @70cc/hr given recent angiogram    ID:  monitor for fevers, afebrile     Heme:   IMPROVE score: 1; SCD's for DVT ppx    DISPOSITION:   -monitor in NSCU for now, possible discharge tomorrow.     critical patient at risk of aneurysm rupture, right extremity ischemia :45y/o F hx TIA, large PFO, MARGAUX on c-pap, admitted for right internal carotid aneurysm coiling x6 c/b inability to retrieve x1 coil, coil tacked to brachial lumen where a stent was placed     NEURO:  right carotid aneurysm coiling c/b unable to retrieve x1 coil.   POD#1  -Neurocheck q 4  -ASA 81mg + ticagrelor 90mg 	BID   normal pulses in the right radial and brachial artery   PT/OT  follwo up with NS as outpatient   -tylenol PRN for pain    CARDIOLOGY:  hx Aflutter, PFO   -patient NSR now    PULM:  saturating well on RA, incentive spirometry     GI:  regular diet ordered    RENAL:  IVL     ID:  monitor for fevers, afebrile     Heme:   IMPROVE score: 1; SCD's     DISPOSITION:   home per NS

## 2020-09-10 NOTE — CHART NOTE - NSCHARTNOTEFT_GEN_A_CORE
Interventional Neuro Radiology  Post-Procedure Note MEMO    This is a 44 year old right old male with hx of TIA, PFO, POD#1 status post selective cerebral angiography and endovascular treatment of right carotid artery aneurysm. Patient is seen his morning and is currently without complaints. He notes last night he had an episode of short term memory, which has now resolved.     Neuro: A + O X 3, speech is articulate, thought process is coherent, naming intact, recent and remote memory intact    HEENT: AT/NC PERRL, EOMI, tongue is midline, +facial symmetry   Musculoskeletal: moves all extremities, ecchymosis to bilateral wrist, +peripheral pulses, motor 5/5     Allergies: No Known Allergies  PMHX: PFO, TIA, Lyme disease, left glenoid labrum tear, rhabdomyolysis, concussion, sleep apnea, atrial flutter   PSHX: tonsillectomy  Social History: , NP   FAMILY HISTORY: non-contributory     Current Medications: aspirin enteric coated 81 milliGRAM(s) Oral   atorvastatin 20 milliGRAM(s) Oral at bedtime  influenza   Vaccine 0.5 milliLiter(s) IntraMuscular once  senna 2 Tablet(s) Oral at bedtime  ticagrelor 90 milliGRAM(s) Oral two times a day      Labs:                         13.3   7.85  )-----------( 203      ( 09 Sep 2020 20:42 )             38.1       09-09    140  |  108  |  19  ----------------------------<  160<H>  4.1   |  21<L>  |  0.91    Ca    8.7      09 Sep 2020 20:42  Phos  4.4     09-09  Mg     1.8     09-09        Assessment/Plan:   This is a 44 year old right old male with hx of TIA, PFO, POD#1 status post selective cerebral angiography and endovascular treatment of right carotid artery aneurysm.     1. Systolic blood pressure less than 160  2. Continue ASA 81mg and Brilinta 90mg every 12 hours  3. Continue incision checks  4. Please mobilize patient, encourage out of bed to chair  5. Discharge planning   6. If in bed please continue pneumatic compression device   7. Plan will be discussed with Dr Yu Campbell

## 2020-09-10 NOTE — DISCHARGE NOTE PROVIDER - NSDCACTIVITY_GEN_ALL_CORE
Stairs allowed/Walking - Outdoors allowed/Showering allowed/Do not make important decisions/Walking - Indoors allowed/No heavy lifting/straining/Do not drive or operate machinery

## 2020-09-10 NOTE — CHART NOTE - NSCHARTNOTEFT_GEN_A_CORE
Post-embolization of complex intracranial aneurysm with flow diverter and coils  The procedure was complicated by coil stretch; and as a result there os a stretched coil filament that extends from the aneurysm to the right brachial artery. A stent was placed to prevent filament movement .  There were no cerebrovascular complications.  Patient seen and examined multiple times post-procedure and has no neurological symptoms: awake, alert, fluent, 5/5 all limbs, no drift, eomi, face symmetric, vff  Radial artery puncture site with breanne hematoma. Normal perfusion, strength, sensation of right hand    Plan;  Discharge to home on aspirin 81 mg daily and Brillinta 90 mg twice daily  Follow-up in clinic in 2 weeks

## 2020-09-10 NOTE — PROGRESS NOTE ADULT - ASSESSMENT
44M s/p coiling  R ICA aneurysm c/b retained coil in brachial lumen s/p stent deployment to tack coil to vessel wall  - doing well post procedure  - Plan for D/C in am

## 2020-09-10 NOTE — PROGRESS NOTE ADULT - SUBJECTIVE AND OBJECTIVE BOX
INTERVAL HISTORY: HPI:  44yr old male with hx of TIA 5/2020 of expressive aphasia for 40min.  work-up for TIA on CT scan showed Right carotid aneurysm. Pt now coming   in for cerebral angiogram. Pt hx sig for Large PFO MARGAUX c-pap. No BLE thrombus  Sophia. progress notes were faxed to Dr. Campbell    Note; covid test Shanti result faxed to [facility] (04 Sep 2020 16:32)      PRESSORS: [ ] YES [ ] NO  WHICH:  DOSE:    ANTIBIOTICS:                  DATE STARTED:  ANTIBIOTICS:                  DATE STARTED:  ANTIBIOTICS:                  DATE STARTED:    MEDICATIONS  (STANDING):  aspirin enteric coated 81 milliGRAM(s) Oral <User Schedule>  atorvastatin 20 milliGRAM(s) Oral at bedtime  influenza   Vaccine 0.5 milliLiter(s) IntraMuscular once  senna 2 Tablet(s) Oral at bedtime  ticagrelor 90 milliGRAM(s) Oral two times a day    MEDICATIONS  (PRN):      Drug Dosing Weight  Height (cm): 185.42 (09 Sep 2020 09:57)  Weight (kg): 93 (09 Sep 2020 09:57)  BMI (kg/m2): 27.1 (09 Sep 2020 09:57)  BSA (m2): 2.17 (09 Sep 2020 09:57)    CENTRAL LINE: [ ] YES [x ] NO  LOCATION:   DATE INSERTED:  REMOVE: [ ] YES [ ] NO  EXPLAIN:    MIRANDA: [ ] YES [ x] NO    DATE INSERTED:  REMOVE: [ ] YES [ ] NO  EXPLAIN:    A-LINE: [ ] YES [x ] NO  LOCATION:   DATE INSERTED:  REMOVE: [ ] YES [ ] NO  EXPLAIN:    PAST MEDICAL & SURGICAL HISTORY:  TIA (transient ischemic attack): 5/2020 40 min of expressive aphasia  Lyme disease: 5/2015 tx  Glenoid labrum tear: Left 9/2014  History of rhabdomyolysis: 2/2011  Head concussion: 12/1998  Sleep apnea: c-pap  PFO (patent foramen ovale): no treatment  Atrial flutter: self limited 1/2001  Cyst, baker's knee, right  History of Tonsillectomy: childhood      REVIEW OF SYSTEMS: [ ] Unable to Assess due to neurologic exam   [x ] All ROS addressed below are non-contributory, except:  Neuro: [ ] Headache [ ] Back pain [ ] Numbness [ ] Weakness [ ] Ataxia [ ] Dizziness [ ] Aphasia [ ] Dysarthria [ ] Visual disturbance  Resp: [ ] Shortness of breath/dyspnea, [ ] Orthopnea [ ] Cough  CV: [ ] Chest pain [ ] Palpitation [ ] Lightheadedness [ ] Syncope  Renal: [ ] Thirst [ ] Edema  GI: [ ] Nausea [ ] Emesis [ ] Abdominal pain [ ] Constipation [ ] Diarrhea  Hem: [ ] Hematemesis [ ] bright red blood per rectum  ID: [ ] Fever [ ] Chills [ ] Dysuria  ENT: [ ] Rhinorrhea      ICU Vital Signs Last 24 Hrs  T(C): 36.7 (10 Sep 2020 05:00), Max: 36.8 (09 Sep 2020 15:00)  T(F): 98.1 (10 Sep 2020 05:00), Max: 98.3 (10 Sep 2020 03:00)  HR: 77 (10 Sep 2020 06:00) (47 - 93)  BP: 117/80 (10 Sep 2020 06:00) (106/73 - 150/94)  BP(mean): 91 (10 Sep 2020 06:00) (79 - 91)  ABP: 131/69 (09 Sep 2020 20:00) (111/62 - 145/79)  ABP(mean): 88 (09 Sep 2020 20:00) (77 - 102)  RR: 24 (10 Sep 2020 06:00) (10 - 24)  SpO2: 100% (10 Sep 2020 06:00) (97% - 100%)          I&O's Detail    09 Sep 2020 07:01  -  10 Sep 2020 07:00  --------------------------------------------------------  IN:    IV PiggyBack: 100 mL    Oral Fluid: 120 mL    sodium chloride 0.9%: 560 mL  Total IN: 780 mL    OUT:    Indwelling Catheter - Urethral: 250 mL    Voided: 1150 mL  Total OUT: 1400 mL    Total NET: -620 mL              PHYSICAL EXAM:    General: No Acute Distress     Neurological: Awake, alert oriented to person, place and time, Following Commands, PERRL, EOMI, Face Symmetrical, Speech Fluent, Moving all extremities, Muscle Strength normal in all four extremities, No Drift, Sensation to Light Touch Intact    Pulmonary: Clear to Auscultation, No Rales, No Rhonchi, No Wheezes     Cardiovascular: S1, S2, Regular Rate and Rhythm     Gastrointestinal: Soft, Nontender, Nondistended     Extremities: No calf tenderness     Incision:         LABS:  CBC Full  -  ( 09 Sep 2020 20:42 )  WBC Count : 7.85 K/uL  RBC Count : 4.50 M/uL  Hemoglobin : 13.3 g/dL  Hematocrit : 38.1 %  Platelet Count - Automated : 203 K/uL  Mean Cell Volume : 84.7 fl  Mean Cell Hemoglobin : 29.6 pg  Mean Cell Hemoglobin Concentration : 34.9 gm/dL  Auto Neutrophil # : x  Auto Lymphocyte # : x  Auto Monocyte # : x  Auto Eosinophil # : x  Auto Basophil # : x  Auto Neutrophil % : x  Auto Lymphocyte % : x  Auto Monocyte % : x  Auto Eosinophil % : x  Auto Basophil % : x    09-09    140  |  108  |  19  ----------------------------<  160<H>  4.1   |  21<L>  |  0.91    Ca    8.7      09 Sep 2020 20:42  Phos  4.4     09-09  Mg     1.8     09-09            RADIOLOGY & ADDITIONAL STUDIES:

## 2020-09-10 NOTE — PROGRESS NOTE ADULT - SUBJECTIVE AND OBJECTIVE BOX
Patient seen and examined at bedside.    --Anticoagulation--  aspirin enteric coated 81 milliGRAM(s) Oral <User Schedule>  ticagrelor 90 milliGRAM(s) Oral two times a day    T(C): 36.7 (09-09-20 @ 23:00), Max: 36.8 (09-09-20 @ 15:00)  HR: 77 (09-10-20 @ 00:00) (59 - 93)  BP: 113/66 (09-10-20 @ 00:00) (113/66 - 150/94)  RR: 23 (09-10-20 @ 00:00) (10 - 23)  SpO2: 99% (09-10-20 @ 00:00) (97% - 100%)  Wt(kg): --    Exam: AAOx3, FC, CL[place]ON 5/5, denies pain, peripheral pulses OK, extremities warm, groin site OK

## 2020-09-11 NOTE — CHART NOTE - NSCHARTNOTEFT_GEN_A_CORE
Interventional Neuro Radiology       I spoke with Mr Castro at 6:25pm, on Friday 9-, patient admits to soreness at his right wrist, at the puncture site. Patient reports he has been taking ASA 81mg and Brilinta 90mg every 12 hours. Patient will call the office Monday to schedule a full-up appointment .     Hannah Rivers PA-C

## 2020-09-16 VITALS — HEIGHT: 73 IN | WEIGHT: 205 LBS | BODY MASS INDEX: 27.17 KG/M2

## 2020-09-16 NOTE — PHYSICAL EXAM
[General Appearance - Alert] : alert [General Appearance - In No Acute Distress] : in no acute distress [General Appearance - Well Developed] : well developed [General Appearance - Well Nourished] : well nourished [General Appearance - Well-Appearing] : healthy appearing [Oriented To Time, Place, And Person] : oriented to person, place, and time [Affect] : the affect was normal [Impaired Insight] : insight and judgment were intact [Memory Recent] : recent memory was not impaired [Person] : oriented to person [Place] : oriented to place [Time] : oriented to time [] : no respiratory distress [Respiration, Rhythm And Depth] : normal respiratory rhythm and effort [Exaggerated Use Of Accessory Muscles For Inspiration] : no accessory muscle use

## 2020-09-16 NOTE — HISTORY OF PRESENT ILLNESS
[FreeTextEntry1] : 44 year old male with recent diagnosis of large petrous carotid artery aneurysm found during work-up of an episode of expressive aphasia.\par \par In May 2020 he developed an episode of expressive aphasia. HE couldn't talk but he could text. This occurred after working with few hours of sleep. It resolved within 40 min and did not recur. HAd MRI without stroke or hemorrhage. Cardiac work-up was negative including Holter monitor.\par \par He had an episode of atrial flutter in 2011. After the TIA he was prescribed Eliquis and is followed by Dr. Bishop (Neurologist at [place]).\par \par He denies facial or extremity weakness, language impairment, vision loss, double vision. 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

## 2020-09-17 ENCOUNTER — APPOINTMENT (OUTPATIENT)
Dept: NEUROSURGERY | Facility: CLINIC | Age: 45
End: 2020-09-17
Payer: COMMERCIAL

## 2020-09-23 PROBLEM — Z86.69 HISTORY OF OBSTRUCTIVE SLEEP APNEA: Status: RESOLVED | Noted: 2020-09-23 | Resolved: 2020-09-23

## 2020-09-23 PROBLEM — Q21.1 PATENT FORAMEN OVALE: Status: RESOLVED | Noted: 2020-09-23 | Resolved: 2020-09-23

## 2020-09-23 RX ORDER — TICAGRELOR 90 MG/1
90 TABLET ORAL
Qty: 5 | Refills: 0 | Status: DISCONTINUED | COMMUNITY
Start: 2020-08-28 | End: 2020-09-23

## 2020-09-24 ENCOUNTER — APPOINTMENT (OUTPATIENT)
Dept: NEUROSURGERY | Facility: CLINIC | Age: 45
End: 2020-09-24
Payer: COMMERCIAL

## 2020-09-24 VITALS
RESPIRATION RATE: 16 BRPM | HEIGHT: 73 IN | WEIGHT: 205 LBS | OXYGEN SATURATION: 98 % | BODY MASS INDEX: 27.17 KG/M2 | DIASTOLIC BLOOD PRESSURE: 73 MMHG | HEART RATE: 60 BPM | TEMPERATURE: 97.4 F | SYSTOLIC BLOOD PRESSURE: 118 MMHG

## 2020-09-24 DIAGNOSIS — Z86.69 PERSONAL HISTORY OF OTHER DISEASES OF THE NERVOUS SYSTEM AND SENSE ORGANS: ICD-10-CM

## 2020-09-24 DIAGNOSIS — Z86.39 PERSONAL HISTORY OF OTHER ENDOCRINE, NUTRITIONAL AND METABOLIC DISEASE: ICD-10-CM

## 2020-09-24 DIAGNOSIS — Q21.1 ATRIAL SEPTAL DEFECT: ICD-10-CM

## 2020-09-24 PROCEDURE — 99214 OFFICE O/P EST MOD 30 MIN: CPT

## 2020-09-24 RX ORDER — ATORVASTATIN CALCIUM 20 MG/1
20 TABLET, FILM COATED ORAL
Refills: 0 | Status: ACTIVE | COMMUNITY

## 2020-09-24 RX ORDER — DOCUSATE SODIUM 100 MG/1
CAPSULE ORAL
Refills: 0 | Status: ACTIVE | COMMUNITY

## 2020-09-24 NOTE — PHYSICAL EXAM
[General Appearance - Alert] : alert [General Appearance - In No Acute Distress] : in no acute distress [General Appearance - Well Nourished] : well nourished [General Appearance - Well Developed] : well developed [General Appearance - Well-Appearing] : healthy appearing [Oriented To Time, Place, And Person] : oriented to person, place, and time [Impaired Insight] : insight and judgment were intact [Affect] : the affect was normal [Memory Recent] : recent memory was not impaired [Person] : oriented to person [Place] : oriented to place [Time] : oriented to time [Cranial Nerves Optic (II)] : visual acuity intact bilaterally,  pupils equal round and reactive to light [Cranial Nerves Oculomotor (III)] : extraocular motion intact [Cranial Nerves Trigeminal (V)] : facial sensation intact symmetrically [Cranial Nerves Facial (VII)] : face symmetrical [Cranial Nerves Vestibulocochlear (VIII)] : hearing was intact bilaterally [Cranial Nerves Glossopharyngeal (IX)] : tongue and palate midline [Cranial Nerves Accessory (XI - Cranial And Spinal)] : head turning and shoulder shrug symmetric [Cranial Nerves Hypoglossal (XII)] : there was no tongue deviation with protrusion [Motor Handedness Right-Handed] : the patient is right hand dominant [] : no respiratory distress [Respiration, Rhythm And Depth] : normal respiratory rhythm and effort [Exaggerated Use Of Accessory Muscles For Inspiration] : no accessory muscle use [Heart Rate And Rhythm] : heart rate was normal and rhythm regular [5] : C6 wrist extensors 5/5 [Sensation Tactile Decrease] : light touch was intact [Abnormal Walk] : normal gait [Skin Color & Pigmentation] : normal skin color and pigmentation [Skin Turgor] : normal skin turgor [FreeTextEntry1] : Normal color, capillary refill of R hand

## 2020-09-24 NOTE — HISTORY OF PRESENT ILLNESS
[FreeTextEntry1] : Hospital Course: \par Discharge Date	10-Sep-2020 \par Admission Date	09-Sep-2020 07:36 \par Reason for Admission	Admitted 9/9 elective angiogram: JONES stent and coiling of \par JENNIFER aneurysm \par 	 \par Patient is a 44 year old male with a past medical history of PFO (untreated(, MARGAUX on CPAP at night, TIA in 5/2020 who was incidentally found with a JENNIFER aneurysm as outpatient on work up of TIA. He was admitted on 9/9/20 from elective cerebral angiogram for stent assisted coiling of JENNIFER aneurysm. Procedure (JONES stent). The procedure was complicated by a stretched coil filament which extened in the brachial artery was secured in place with a Zilver stent. He was discharged to home the day after the procedure.\par \par Post procedure, the patient is at his neurologic baseline which is intact. He was resumed on Aspirin 81 mg daily and Brilinta 90 mg BID after the procedure. He is ambulating independently. \par \par Today he presents for his first post procedure visit.  He states that he is feeling well except for right forearm pain.  Pain is 4/10 and relieved with Tylenol prn.  Pain is not disabling and he denies restriction of ROM in RUE.  He denies tingling and numbness in RUE.  He denies gum and nose bleeds.\par \par Compliant with aspirin 81 daily and ticagrelor 90 bid.

## 2020-09-24 NOTE — ASSESSMENT
[FreeTextEntry1] : IMPRESSION:\par Doing well after stent-assisted embolization of intracranial aneurysm.\par Also stent in the R brachial artery due to stretched coil.\par No neurological complaints\par Will continue aspirin and ticagrelor for  6 months and then he can switch back to Eliquis.\par \par \par PLAN:\par 1.Continue Brilinta 90 mg 2 x day and ASA 81 mg daily.\par 2. OKay to put pressure on RUE with exercises or daily routines. Consider using LUE for BP readings.\par 3. MRA brain with contrast in 6 months - March 2021.\par 4. Follow-up visit after the MRA

## 2020-11-21 NOTE — ASU PATIENT PROFILE, ADULT - HARM RISK FACTORS
BP cuff taken off pts R upper arm at 1638. Dr James at bedside to assess at 1700. No hematoma. No complaints of pain from pt.   
DOS: 11/20/2020 Pre-procedure interview was completed. Patient was instructed to take the following medications on the day of surgery:    Per office guidelines    Patient was informed of current policy of only one adult allowed DOS for outpatient procedures due to coronavirus precautions. Patients and caregivers / drivers will be screened upon entry to hospital.  Patient verbalized understanding of the policy and will wear a mask. Patient also advised that  parking is not available at this time.           
Fide June discharged to home accompanied by daughter.   Patient provided with the following educational materials upon discharge:  Radial site care with softseal.   Valuables and belongings sent with patient.   discharge summary, discharge instructions, medications and follow up appointments reviewed with patient.  Patient verbalized understanding.  
Pt came back from cath lab c/o pain in R upper arm. Large hematoma found on upper arm. Anton James MD paged and at bedside. Pressure held for 20 minutes and BP cuff placed on pt for another 30. When cuff removed hematoma began to form. Cuff placed back on pt for 30 min. When cuff was removed for second time Dr. James was at bedside to assess. Small hematoma started forming. Per Dr. James placed cuff back on pt until his return. Continue Q15 in checks on pt.   
Pt palced on tele. CTU called  
Pt walking back from bathroom when sudden SOB and unable to walk back to room. Pt HR 120s, placed on 8L O2 sat 88%. Pt sat in wheelchair and O2 titrated back to 2L (home O2). Pt HR returned to 100-110. Dr. Wilson called, plan to continue to monitor.   
Reviewed inpatient cardiac rehabilitation education with the patient. Phase 2 Cardiac Rehab referral [facility], [phone]. A home exercise prescription, activity restrictions & precautions, and appropriate risk factor education have been completed. All education is documented in the Cardiac Rehabilitation Education Record. Total time spent educating the patient was 30 minutes. Patient has met all cardiac rehab goals. Will discharge from service.           
no

## 2020-11-27 ENCOUNTER — APPOINTMENT (OUTPATIENT)
Dept: NEUROLOGY | Facility: CLINIC | Age: 45
End: 2020-11-27

## 2021-02-23 NOTE — ASU PREOP CHECKLIST - BP NONINVASIVE DIASTOLIC (MM HG)
1.  Cataract OU: Discussed the risks benefits alternatives and limitations of cataract surgery including infection bleeding loss of vision retinal tears detachment. The patient stated a full understanding and a desire to proceed with the procedure in both eyes. Refractive options reviewed. Patient understands IOL calcs are more difficult and that intraoperative ORA measurements will increase the success but does not guarantee not needing a refractive enhancement. Pt lives in [facility] now will refer for surgical consult2.   S/P  Lasik: hyperopic with monovision OD reading OS distance in 2012Not a candidate for enhancement given moderate cataract
Discussed the risks benefits alternatives and limitations of cataract surgery including infection bleeding loss of vision retinal tears detachment. The patient stated a full understanding and a desire to proceed with the procedure in both eyes. Refractive options were reviewed. Patient has elected to have monovision. The patient has tried monovision in the past.  I have recommended ORA guidance to confirm lens power choice. There is no guarantee of perfect uncorrected acuity and the possible need for enhancement was discussed. The patient may need glasses for night driving and to fine tune the vision.
Return for an appointment for [address] [place] [address] with Dr. Helena Jimenez.
S/P  Lasik: hyperopic with monovision OD reading OS distance in 2012Not a candidate for enhancement given moderate cataract
94

## 2021-03-05 RX ORDER — ASPIRIN 81 MG/1
81 TABLET, CHEWABLE ORAL DAILY
Qty: 30 | Refills: 6 | Status: ACTIVE | COMMUNITY
Start: 2021-03-05 | End: 1900-01-01

## 2021-03-12 ENCOUNTER — APPOINTMENT (OUTPATIENT)
Dept: MRI IMAGING | Facility: IMAGING CENTER | Age: 46
End: 2021-03-12
Payer: COMMERCIAL

## 2021-03-12 ENCOUNTER — TRANSCRIPTION ENCOUNTER (OUTPATIENT)
Age: 46
End: 2021-03-12

## 2021-03-12 ENCOUNTER — OUTPATIENT (OUTPATIENT)
Dept: OUTPATIENT SERVICES | Facility: HOSPITAL | Age: 46
LOS: 1 days | End: 2021-03-12
Payer: COMMERCIAL

## 2021-03-12 DIAGNOSIS — I72.0 ANEURYSM OF CAROTID ARTERY: ICD-10-CM

## 2021-03-12 DIAGNOSIS — M71.21 SYNOVIAL CYST OF POPLITEAL SPACE [BAKER], RIGHT KNEE: Chronic | ICD-10-CM

## 2021-03-12 PROCEDURE — A9585: CPT

## 2021-03-12 PROCEDURE — 70546 MR ANGIOGRAPH HEAD W/O&W/DYE: CPT | Mod: 26

## 2021-03-12 PROCEDURE — 70546 MR ANGIOGRAPH HEAD W/O&W/DYE: CPT

## 2021-03-18 ENCOUNTER — APPOINTMENT (OUTPATIENT)
Dept: NEUROSURGERY | Facility: CLINIC | Age: 46
End: 2021-03-18
Payer: COMMERCIAL

## 2021-03-18 VITALS
HEIGHT: 73 IN | DIASTOLIC BLOOD PRESSURE: 85 MMHG | TEMPERATURE: 98.7 F | SYSTOLIC BLOOD PRESSURE: 128 MMHG | HEART RATE: 81 BPM | WEIGHT: 205 LBS | RESPIRATION RATE: 16 BRPM | BODY MASS INDEX: 27.17 KG/M2 | OXYGEN SATURATION: 98 %

## 2021-03-18 PROCEDURE — 99072 ADDL SUPL MATRL&STAF TM PHE: CPT

## 2021-03-18 PROCEDURE — 99213 OFFICE O/P EST LOW 20 MIN: CPT

## 2021-03-18 RX ORDER — DIAZEPAM 2 MG/1
2 TABLET ORAL
Qty: 1 | Refills: 0 | Status: DISCONTINUED | COMMUNITY
Start: 2021-02-26 | End: 2021-03-18

## 2021-03-18 RX ORDER — AMLODIPINE BESYLATE 5 MG/1
5 TABLET ORAL
Refills: 0 | Status: ACTIVE | COMMUNITY

## 2021-03-18 RX ORDER — TICAGRELOR 90 MG/1
90 TABLET ORAL
Refills: 0 | Status: DISCONTINUED | COMMUNITY
End: 2021-03-18

## 2021-03-19 NOTE — PHYSICAL EXAM
[General Appearance - Alert] : alert [General Appearance - In No Acute Distress] : in no acute distress [Oriented To Time, Place, And Person] : oriented to person, place, and time [Person] : oriented to person [Place] : oriented to place [Time] : oriented to time [Sclera] : the sclera and conjunctiva were normal [Outer Ear] : the ears and nose were normal in appearance [Neck Appearance] : the appearance of the neck was normal [] : no respiratory distress [Respiration, Rhythm And Depth] : normal respiratory rhythm and effort [Heart Rate And Rhythm] : heart rate was normal and rhythm regular [Abnormal Walk] : normal gait [Skin Color & Pigmentation] : normal skin color and pigmentation

## 2021-03-21 NOTE — DATA REVIEWED
[de-identified] : EXAM: MR ANGIO BRAIN WAW IC\par \par \par PROCEDURE DATE: 03/12/2021\par \par \par \par INTERPRETATION: Clinical indications: History of carotid aneurysm embolization.\par \par MRA of the [place] was performed using 3-D [place] technique. The patient was injected with approximately 9 cc of Gadavist IV with 1 cc of contrast discarded. Gadolinium infusion MRA of the [place] was performed.\par \par This exam is compared with prior MRA [place] performed on May 10, 2020.\par \par Embolic material adjacent to the petrous segment of the right internal carotid artery is now identified. This is in the region of the previous identified aneurysm. There is slight decrease flow related signal involving the adjacent right internal carotid artery which could be due to artifact versus short segment of stenosis. Evaluation of the distal left internal carotid artery appears normal.\par \par Both anterior cerebral, middle cerebral basilar and posterior cerebral arteries appear normal.\par \par IMPRESSION: Embolic material now identified adjacent to the petrous segment of the right internal carotid artery with slight decrease flow-related signal involving the adjacent internal carotid artery.\par \par \par \par \par \par \par SATISH GALLARDO M.D., ATTENDING RADIOLOGIST\par This document has been electronically signed. Mar 15 2021 10:00AM

## 2021-03-21 NOTE — HISTORY OF PRESENT ILLNESS
[FreeTextEntry1] : Mr. Castro is a 45 year old male with a past medical history of PFO (untreated), MARGAUX on CPAP at night, TIA in 5/2020 who was incidentally found with a JENNIFER aneurysm as outpatient during work up of TIA. Her presents today for follow up and MRA review.  On 9/9/20, he underwent successful stent assisted (JONES) coiling of JENNIFER aneurysm.  The procedure was complicated by a stretched coil filament which extended in the brachial artery which was secured in place with a Zilver stent. He was discharged to home the day after the procedure.\par \par He remains compliant with ASA 81mg and Brilinta 90mg BID.\par \par He has no complaints.  He no longer has any right forearm pain that he was c/o post procedure.\par \par Denies headaches, dizziness, visual scintillations, episodes of visual loss or blurring, diplopia, hearing changes, tinnitus, speech problems, swallowing difficulties, numbness, tingling, weakness, tremors, twitches, seizures, imbalance or coordination difficulties

## 2021-03-21 NOTE — ASSESSMENT
[FreeTextEntry1] : Impression:\par Doing well post-stent assisted embolization of large petrous Internal Carotid Artery aneurysm.\par No residual on follow-up MRA with patent stent.\par No issues from RIGHT brachial artery stent required to secure stretched coil\par \par Plan:\par Stop Brilinta\par Continue aspirin\par - 325 mg daily if no anticoagulation\par - 81 mg if he requires anticoagulation\par Will see Dr. Bishop to discuss anticoagulation\par Follow-up MRA in 12 months (MAR 2022)\par

## 2022-03-30 ENCOUNTER — APPOINTMENT (OUTPATIENT)
Dept: MRI IMAGING | Facility: CLINIC | Age: 47
End: 2022-03-30
Payer: COMMERCIAL

## 2022-03-30 ENCOUNTER — OUTPATIENT (OUTPATIENT)
Dept: OUTPATIENT SERVICES | Facility: HOSPITAL | Age: 47
LOS: 1 days | End: 2022-03-30
Payer: COMMERCIAL

## 2022-03-30 DIAGNOSIS — M71.21 SYNOVIAL CYST OF POPLITEAL SPACE [BAKER], RIGHT KNEE: Chronic | ICD-10-CM

## 2022-03-30 DIAGNOSIS — Z00.8 ENCOUNTER FOR OTHER GENERAL EXAMINATION: ICD-10-CM

## 2022-03-30 PROCEDURE — 70546 MR ANGIOGRAPH HEAD W/O&W/DYE: CPT | Mod: 26

## 2022-03-30 PROCEDURE — A9585: CPT

## 2022-03-30 PROCEDURE — 70546 MR ANGIOGRAPH HEAD W/O&W/DYE: CPT

## 2022-04-12 ENCOUNTER — APPOINTMENT (OUTPATIENT)
Dept: NEUROSURGERY | Facility: CLINIC | Age: 47
End: 2022-04-12
Payer: COMMERCIAL

## 2022-04-12 PROCEDURE — 99442: CPT

## 2022-04-12 RX ORDER — TICAGRELOR 90 MG/1
90 TABLET ORAL TWICE DAILY
Qty: 60 | Refills: 4 | Status: DISCONTINUED | COMMUNITY
Start: 2020-10-07 | End: 2022-04-12

## 2022-04-12 RX ORDER — APIXABAN 5 MG/1
5 TABLET, FILM COATED ORAL
Refills: 0 | Status: ACTIVE | COMMUNITY

## 2022-04-13 NOTE — HISTORY OF PRESENT ILLNESS
[FreeTextEntry1] : Mr. Castro is a 46 year old male who presents today for follow up and MRA review.  He has a past medical history of PFO (untreated), MARGAUX on CPAP at night, TIA in 5/2020 who was incidentally found with a JENNIFER aneurysm as outpatient during work up of TIA. Her presents today for follow up and MRA review.  On 9/9/20, he underwent successful stent assisted (JONES) coiling of JENNIFER aneurysm.  The procedure was complicated by a stretched coil filament which extended in the brachial artery which was secured in place with a Zilver stent. He was discharged to home the day after the procedure.\par \par He remains compliant with ASA 81mg.  He also is on Eliquis 5mg BID for TIA/Aflutter. \par \par Denies headaches, dizziness, visual scintillations, episodes of visual loss or blurring, diplopia, hearing changes, tinnitus, speech problems, swallowing difficulties, numbness, tingling, weakness, tremors, twitches, seizures, imbalance or coordination difficulties

## 2022-04-13 NOTE — ASSESSMENT
[FreeTextEntry1] : IMPRESSION\par Doing well post-stent assisted embolization of large petrous Internal Carotid Artery aneurysm.\par No issues from RIGHT brachial artery stent required to secure stretched coil\par Recent headaches and visual disturbances presumed to be migraine.\par \par MRA Head w/wo 3/2022 reveals patent stent, no residual aneurysm.\par \par PLAN\par Continue aspirin\par - 325 mg daily if no anticoagulation\par - 81 mg if he requires anticoagulation. for now he remains on Eliquis\par MRA Head w/wo in 9/2023 for routine follow-up\par Follow Up with Me After Imaging\par

## 2022-05-31 ENCOUNTER — APPOINTMENT (OUTPATIENT)
Dept: NEUROSURGERY | Facility: CLINIC | Age: 47
End: 2022-05-31

## 2023-10-05 ENCOUNTER — OUTPATIENT (OUTPATIENT)
Dept: OUTPATIENT SERVICES | Facility: HOSPITAL | Age: 48
LOS: 1 days | End: 2023-10-05
Payer: COMMERCIAL

## 2023-10-05 ENCOUNTER — APPOINTMENT (OUTPATIENT)
Dept: MRI IMAGING | Facility: CLINIC | Age: 48
End: 2023-10-05

## 2023-10-05 ENCOUNTER — RESULT REVIEW (OUTPATIENT)
Age: 48
End: 2023-10-05

## 2023-10-05 DIAGNOSIS — M71.21 SYNOVIAL CYST OF POPLITEAL SPACE [BAKER], RIGHT KNEE: Chronic | ICD-10-CM

## 2023-10-05 PROCEDURE — 70546 MR ANGIOGRAPH HEAD W/O&W/DYE: CPT | Mod: 26

## 2023-10-27 ENCOUNTER — EMERGENCY (EMERGENCY)
Facility: HOSPITAL | Age: 48
LOS: 1 days | Discharge: HOME | End: 2023-10-27
Admitting: EMERGENCY MEDICINE
Payer: COMMERCIAL

## 2023-10-27 VITALS
WEIGHT: 199.96 LBS | OXYGEN SATURATION: 98 % | TEMPERATURE: 98 F | HEIGHT: 72 IN | HEART RATE: 79 BPM | SYSTOLIC BLOOD PRESSURE: 133 MMHG | DIASTOLIC BLOOD PRESSURE: 61 MMHG | RESPIRATION RATE: 18 BRPM

## 2023-10-27 DIAGNOSIS — M71.21 SYNOVIAL CYST OF POPLITEAL SPACE [BAKER], RIGHT KNEE: Chronic | ICD-10-CM

## 2023-10-27 PROCEDURE — 73610 X-RAY EXAM OF ANKLE: CPT | Mod: 26,LT

## 2023-10-27 PROCEDURE — 99283 EMERGENCY DEPT VISIT LOW MDM: CPT

## 2023-10-27 NOTE — ED PROVIDER NOTE - CLINICAL SUMMARY MEDICAL DECISION MAKING FREE TEXT BOX
Pt presents for idania pain  No swelling, erythema, warmth to joint. ROM intact, VSS - septic arthritis unlikely.  Pt does not meet Wells criteria - DVT unlikely  Does not meet Karavel’s signs for Flexor Tenosynovitis  No paresthesia, pallor, paralysis, pulselenness - compartment syndrome unlikely  Xray ordered to rule out/in bony deformities.  Pain control medications ordered  '

## 2023-10-27 NOTE — ED ADULT TRIAGE NOTE - CHIEF COMPLAINT QUOTE
Walk in pt with complaints of left ankle pain s/p trip fall yesterday [place]. 1 tablet tylenol PO taken PTA.

## 2023-10-27 NOTE — ED ADULT TRIAGE NOTE - NS ED TRIAGE AVPU SCALE
Attending Attestation (For Attendings USE Only)...
Alert-The patient is alert, awake and responds to voice. The patient is oriented to time, place, and person. The triage nurse is able to obtain subjective information.

## 2023-10-27 NOTE — ED PROVIDER NOTE - PATIENT PORTAL LINK FT
You can access the FollowMyHealth Patient Portal offered by [facility] by registering at the following website: [email or web address]. By joining [employer]’s FollowMyHealth portal, you will also be able to view your health information using other applications (apps) compatible with our system.

## 2023-10-27 NOTE — ED PROVIDER NOTE - OBJECTIVE STATEMENT
47-year-old male, medical history of TIA on Eliquis, presents this emergency department for right ankle pain after twisting it.  Patient states that pain is mostly over the lateral malleolus.  This occurred 1 hour prior to arrival as patient was getting on the train to work.  Has not tried medications or ice.  Arrived in an Ace wrap and states that wrap offer significant relief of symptoms. Denies SOB, CP, calf tenderness/swelling, hemoptysis, recent surgeries, hx of CA, long plane/train/car rides, past clots in legs/lungs. Has been ambulating.

## 2023-10-27 NOTE — ED PROVIDER NOTE - PROGRESS NOTE DETAILS
X-rays are unremarkable  Patient stable for discharge  Results reviewed with patient.  Patient understands and agrees with plan.  Agreed to follow-up primary care doctor in 2 to 3 days.

## 2023-10-27 NOTE — ED ADULT NURSE NOTE - OBJECTIVE STATEMENT
pt reports left ankle pain sustained after fall yesterday after tripping on [place] steps; pt alert & oriented x4, in no acute distress; able to ambulate with steady gait although noted some guarding on left foot; distal CSM intact on affected area, CRT <2secs with good and equal pulse strength on bilateral lower extremities pt reports left ankle pain sustained after fall yesterday after tripping on subway steps; pt on blood thinners, denies LOC/head strike; pt alert & oriented x4, in no acute distress; able to ambulate with steady gait although noted some guarding on left foot; noted some bruising on right foot due to trip-fall mechanism, no active bleeding, pt denies any pain on right foot; distal CSM intact on affected area, CRT <2secs with good and equal pulse strength on bilateral lower extremities

## 2023-10-27 NOTE — ED PROVIDER NOTE - NSFOLLOWUPINSTRUCTIONS_ED_ALL_ED_FT
Overview  A strain happens when you overstretch, or pull, a muscle. A sprain occurs when you stretch or tear a ligament, the tough tissue that connects one bone to another. These problems can happen when you exercise or lift something or when you are in a car crash.    Rest and other home care can help strains and sprains heal.    The doctor has checked you carefully, but problems can develop later. If you notice any problems or new symptoms, get medical treatment right away.    Follow-up care is a key part of your treatment and safety. Be sure to make and go to all appointments, and call your doctor or nurse advice line ([phone] in most provinces and territories) if you are having problems. It's also a good idea to know your test results and keep a list of the medicines you take.    How can you care for yourself at home?  If your doctor gave you a sling, splint, brace, or immobilizer, use it exactly as directed.  Rest the strained or sprained area, and follow your doctor's advice about when you can be active again.  Put ice or a cold pack on the sore area for 10 to 20 minutes at a time to stop swelling. Try this every 1 to 2 hours for 3 days (when you are awake) or until the swelling goes down. Put a thin cloth between the ice pack and your skin. Keep your splint or brace dry.  Prop up a sore arm or leg on a pillow when you ice it or anytime you sit or lie down. Try to keep it higher than the level of your heart. This will help reduce swelling.  Take pain medicines exactly as directed.  If the doctor gave you a prescription medicine for pain, take it as prescribed.  If you are not taking a prescription pain medicine, ask your doctor if you can take an over-the-counter medicine.  Do exercises as directed by your doctor or physiotherapist.  Return to your usual level of activity slowly.  Do not do anything that makes the pain worse.  When should you call for help?  	  Call your doctor or nurse advice line now or seek immediate medical care if:    You have severe or increasing pain.  You have tingling, weakness, or numbness in the area.  The area turns cold or changes colour.  Your cast or splint feels too tight.  You have symptoms of a blood clot, such as:  Pain in your calf, back of the knee, thigh, or groin.  Redness and swelling in your leg or groin.  You cannot move the strained part of your body.  Watch closely for changes in your health, and be sure to contact your doctor or nurse advice line if:    You do not get better as expected.

## 2023-10-29 DIAGNOSIS — M25.571 PAIN IN RIGHT ANKLE AND JOINTS OF RIGHT FOOT: ICD-10-CM

## 2023-10-29 DIAGNOSIS — Z86.73 PERSONAL HISTORY OF TRANSIENT ISCHEMIC ATTACK (TIA), AND CEREBRAL INFARCTION WITHOUT RESIDUAL DEFICITS: ICD-10-CM
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
## 2023-10-29 DIAGNOSIS — Z79.01 LONG TERM (CURRENT) USE OF ANTICOAGULANTS: ICD-10-CM
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
## 2023-11-15 PROBLEM — Z78.9 OTHER SPECIFIED HEALTH STATUS: Chronic | Status: ACTIVE | Noted: 2023-10-27

## 2023-11-16 ENCOUNTER — APPOINTMENT (OUTPATIENT)
Dept: NEUROSURGERY | Facility: CLINIC | Age: 48
End: 2023-11-16
Payer: COMMERCIAL

## 2023-11-16 DIAGNOSIS — I72.0 ANEURYSM OF CAROTID ARTERY: ICD-10-CM

## 2023-11-16 PROCEDURE — 99441: CPT

## 2023-11-21 ENCOUNTER — APPOINTMENT (OUTPATIENT)
Dept: NEUROSURGERY | Facility: CLINIC | Age: 48
End: 2023-11-21

## 2024-10-21 NOTE — PATIENT PROFILE ADULT - SBIRT REFERRAL
You can access the FollowMyHealth Patient Portal offered by [facility] by registering at the following website: [email or web address]. By joining [employer]’s FollowMyHealth portal, you will also be able to view your health information using other applications (apps) compatible with our system.
SBIRT referral

## 2025-09-09 ENCOUNTER — NON-APPOINTMENT (OUTPATIENT)
Age: 50
End: 2025-09-09

## 2025-09-10 ENCOUNTER — APPOINTMENT (OUTPATIENT)
Dept: MRI IMAGING | Facility: HOSPITAL | Age: 50
End: 2025-09-10

## 2025-09-10 ENCOUNTER — RESULT REVIEW (OUTPATIENT)
Age: 50
End: 2025-09-10

## 2025-09-11 ENCOUNTER — APPOINTMENT (OUTPATIENT)
Dept: NEUROSURGERY | Facility: CLINIC | Age: 50
End: 2025-09-11
Payer: COMMERCIAL

## 2025-09-11 DIAGNOSIS — I72.0 ANEURYSM OF CAROTID ARTERY: ICD-10-CM

## 2025-09-11 PROCEDURE — 99202 OFFICE O/P NEW SF 15 MIN: CPT | Mod: 93

## 2025-09-16 ENCOUNTER — APPOINTMENT (OUTPATIENT)
Dept: NEUROSURGERY | Facility: CLINIC | Age: 50
End: 2025-09-16